# Patient Record
Sex: MALE
[De-identification: names, ages, dates, MRNs, and addresses within clinical notes are randomized per-mention and may not be internally consistent; named-entity substitution may affect disease eponyms.]

---

## 2018-11-15 ENCOUNTER — APPOINTMENT (OUTPATIENT)
Dept: CARDIOLOGY | Facility: CLINIC | Age: 58
End: 2018-11-15

## 2022-11-16 ENCOUNTER — APPOINTMENT (OUTPATIENT)
Dept: CARDIOLOGY | Facility: CLINIC | Age: 62
End: 2022-11-16

## 2022-11-16 ENCOUNTER — NON-APPOINTMENT (OUTPATIENT)
Age: 62
End: 2022-11-16

## 2022-11-16 VITALS — SYSTOLIC BLOOD PRESSURE: 140 MMHG | DIASTOLIC BLOOD PRESSURE: 98 MMHG

## 2022-11-16 VITALS
SYSTOLIC BLOOD PRESSURE: 146 MMHG | WEIGHT: 209 LBS | RESPIRATION RATE: 17 BRPM | HEART RATE: 77 BPM | OXYGEN SATURATION: 99 % | HEIGHT: 69.5 IN | DIASTOLIC BLOOD PRESSURE: 100 MMHG | BODY MASS INDEX: 30.26 KG/M2

## 2022-11-16 DIAGNOSIS — R94.31 ABNORMAL ELECTROCARDIOGRAM [ECG] [EKG]: ICD-10-CM

## 2022-11-16 DIAGNOSIS — Z78.9 OTHER SPECIFIED HEALTH STATUS: ICD-10-CM

## 2022-11-16 PROCEDURE — 99204 OFFICE O/P NEW MOD 45 MIN: CPT | Mod: 25

## 2022-11-16 PROCEDURE — 93000 ELECTROCARDIOGRAM COMPLETE: CPT

## 2022-11-16 NOTE — HISTORY OF PRESENT ILLNESS
[FreeTextEntry1] : He sought cardiovascular evaluation at his girlfriend's request.\par He is a 62-year-old with longstanding hypertension who reports feeling okay overall.  He does not perform any routine aerobic activity but denies any exertional chest pains or shortness of breath.\par He has no history of coronary artery disease, diabetes mellitus, smoking, hypercholesterolemia or family history of premature coronary artery disease.\par A prior knee arthroscopy.\par He reports not taking his medication this morning and just took it 45 minutes ago.

## 2022-12-06 ENCOUNTER — APPOINTMENT (OUTPATIENT)
Dept: UROLOGY | Facility: CLINIC | Age: 62
End: 2022-12-06

## 2022-12-06 DIAGNOSIS — N50.0 ATROPHY OF TESTIS: ICD-10-CM

## 2022-12-06 DIAGNOSIS — N52.9 MALE ERECTILE DYSFUNCTION, UNSPECIFIED: ICD-10-CM

## 2022-12-06 PROCEDURE — 99204 OFFICE O/P NEW MOD 45 MIN: CPT

## 2022-12-06 RX ORDER — TAMSULOSIN HYDROCHLORIDE 0.4 MG/1
0.4 CAPSULE ORAL
Qty: 30 | Refills: 8 | Status: ACTIVE | COMMUNITY
Start: 2022-12-06 | End: 1900-01-01

## 2022-12-06 NOTE — PHYSICAL EXAM
[General Appearance - Well Developed] : well developed [General Appearance - Well Nourished] : well nourished [Normal Appearance] : normal appearance [Well Groomed] : well groomed [General Appearance - In No Acute Distress] : no acute distress [Edema] : no peripheral edema [Respiration, Rhythm And Depth] : normal respiratory rhythm and effort [Exaggerated Use Of Accessory Muscles For Inspiration] : no accessory muscle use [Abdomen Soft] : soft [Abdomen Tenderness] : non-tender [Costovertebral Angle Tenderness] : no ~M costovertebral angle tenderness [Urethral Meatus] : meatus normal [Urinary Bladder Findings] : the bladder was normal on palpation [Testes Mass (___cm)] : there were no testicular masses [No Prostate Nodules] : no prostate nodules [FreeTextEntry1] : left testis uneven with harder small 2 mm nodule, right severely atrophic  [Normal Station and Gait] : the gait and station were normal for the patient's age [] : no rash [No Focal Deficits] : no focal deficits [Oriented To Time, Place, And Person] : oriented to person, place, and time [Affect] : the affect was normal [Mood] : the mood was normal [Not Anxious] : not anxious [No Palpable Adenopathy] : no palpable adenopathy

## 2022-12-06 NOTE — ASSESSMENT
[FreeTextEntry1] : ED - start cialis 5 mg daily \par may also help with BPH\par \par - labs \par \par Prostate at least 80 gm smooth \par \par LUTS \par \par strart flomax at night \par Cialis in AM \par \par may need proscar\par \par bring back for cysto \par \par abnormal scrotal exam - bring back for scrotal US \par \par Erectile dysfunction.\par \par Following was discussed with the patient.  Erectile dysfunction can affect him in the all ages.  Normal quality of erections depends on normal in flow of blood through the cavernosal arteries, distention of the corpora cavernosa and closure of the veins draining the penis.  I compared getting erectile dysfunction to feeling of the sink with force of being in the artery and drain being a vein.  This help patient understand physiology of erections.  Normal erection is regulated by sensory input from the penis as well as normal arousal and processing of the sexual cues.  Complex interplay between the neuroendocrine system and autonomic nervous system as well as sensory input was discussed.\par \par Most common reasons for erectile dysfunction in younger men are performance anxiety, sexual experience, lack of point of reference, and realistic expectations.  Congenital abnormalities of vessels can also be identified.  They typically present with inability to sustain erection from late teenager to early 20s.  Will call with venous leak.\par \par In older men erectile dysfunction can be due to atherosclerosis, elevated cholesterol, low testosterone, diabetes, injury to autonomic nervous system, loss of sensation, abnormal processing of sexual cues and disorder of arousal.\par \par Obesity, smoking, use of statin medication can negatively affect erectile function.\par \par Increasing exercise, healthy eating habits, getting adequate amount of sleep, all of those can be helpful in attaining normal erections.\par \par \par Evaluation of erectile dysfunction is here to work patient's needs and goals.  Typically will start with just oral medication with medication like Cialis or Viagra.  Use of those medications was explained to the patient.  Difference between on demand and daily use was discussed.  Viagra needs to be taken on empty stomach.\par \par Mechanism of action of erectile dysfunction medication was also discussed.\par \par In men who have failed to respond to oral therapy or who have inadequate response level we then moved to penile Doppler ultrasound after penile injection.\par \par Injection of vasculogenic medication, specifically combination of papaverine, phentolamine and prostaglandin E1 is necessary to achieve adequate erection during penile Doppler ultrasound.  If penile Doppler ultrasound is scheduled then Trimix prescription is sent to the pharmacy and patient is made aware that he needs to bring the medication on the day of procedure as we cannot start the medication.\par \par Risks of Trimix injection as well as penile Doppler ultrasound like prolonged erection from the cavernosal injection of Trimix or embarrassment during the penile Doppler ultrasound were discussed with the patient.\par \par Depending on the results of penile Doppler ultrasound we may recommend that the patient see his cardiologist to have cardiac evaluation especially if blood flow through the cavernosal arteries is abnormal.\par \par If venous leak was found or arteriovenous shunting was identified in we discussed with patient management of these conditions.  Specifically venous leak can be sometimes corrected by modified venous stripping using Garcia technique.  In everything fails we can place penile prosthesis however this is the last choice and last step in treating of erectile dysfunction.\par \par Use of self injection is often recommended after patient achieve adequate erection during the penile Doppler ultrasound.\par \par Correction of high hemoglobin A1c, high lipids, low testosterone and cessation of smoking are always recommended.\par \par Prescriptions were given.  Side effects were explained.  Patient will follow-up with me as scheduled.\par \par Risk of priapism was explained.\par \par I have spent total time of 41 min excluding separately reported  time spend performing in the office procedures if such done today. \par \par This includes time spent face to face discussing evaluation, treatment options,  risk, complications, and benefits of each of the therapies and triggers to return to office.\par Social determinants of disease were assessed and necessary measures implemented.\par \par Discussed behavioral modification.\par \par Preventive health measures: effect of BMI, age, high cholesterol, hypertension and DM on sexual and reproductive function in men discussed. \par \par Discussed goals of therapy, realistic expectations. \par \par Pharmacological,holistic, and procedural interventions discussed as appropriate. \par \par Reviewing pertinent notes from other physicians, past lab results, imaging studies, coordinating care, entering orders and preparing note.\par Included time spend on refills of medications and new scripts if needed. \par \par Testosterone is a control substance requiring additional time for refills and prescriptions with dual identify verification. \par If prescribed the time spent on prescribing controlled substances was included in total time. \par \par Use of follow-my health discussed and encouraged.\par

## 2022-12-06 NOTE — HISTORY OF PRESENT ILLNESS
[FreeTextEntry1] : BPH \par LUTS\par \par had issues 10 years ago had TUNA \par - nocturia 3 to 4 times a night \par - no blood in the urine\par - no incontinence \par - daily frequency \par no smoking\par \par ED - not as strong as in the past \par \par

## 2022-12-07 ENCOUNTER — NON-APPOINTMENT (OUTPATIENT)
Age: 62
End: 2022-12-07

## 2022-12-07 LAB
ALBUMIN SERPL ELPH-MCNC: 4.8 G/DL
ALP BLD-CCNC: 135 U/L
ALT SERPL-CCNC: 27 U/L
ANION GAP SERPL CALC-SCNC: 14 MMOL/L
AST SERPL-CCNC: 17 U/L
BASOPHILS # BLD AUTO: 0.02 K/UL
BASOPHILS NFR BLD AUTO: 0.5 %
BILIRUB SERPL-MCNC: 0.4 MG/DL
BUN SERPL-MCNC: 13 MG/DL
CALCIUM SERPL-MCNC: 10.4 MG/DL
CHLORIDE SERPL-SCNC: 98 MMOL/L
CHOLEST SERPL-MCNC: 260 MG/DL
CO2 SERPL-SCNC: 27 MMOL/L
CREAT SERPL-MCNC: 0.63 MG/DL
CRP SERPL HS-MCNC: 5.17 MG/L
EGFR: 108 ML/MIN/1.73M2
EOSINOPHIL # BLD AUTO: 0.08 K/UL
EOSINOPHIL NFR BLD AUTO: 1.9 %
ESTIMATED AVERAGE GLUCOSE: 321 MG/DL
GLUCOSE SERPL-MCNC: 289 MG/DL
HBA1C MFR BLD HPLC: 12.8 %
HCT VFR BLD CALC: 45.9 %
HDLC SERPL-MCNC: 61 MG/DL
HGB BLD-MCNC: 14.5 G/DL
IMM GRANULOCYTES NFR BLD AUTO: 0.5 %
LDLC SERPL CALC-MCNC: 160 MG/DL
LYMPHOCYTES # BLD AUTO: 1.26 K/UL
LYMPHOCYTES NFR BLD AUTO: 29.2 %
MAN DIFF?: NORMAL
MCHC RBC-ENTMCNC: 27.7 PG
MCHC RBC-ENTMCNC: 31.6 GM/DL
MCV RBC AUTO: 87.8 FL
MONOCYTES # BLD AUTO: 0.37 K/UL
MONOCYTES NFR BLD AUTO: 8.6 %
NEUTROPHILS # BLD AUTO: 2.56 K/UL
NEUTROPHILS NFR BLD AUTO: 59.3 %
NONHDLC SERPL-MCNC: 199 MG/DL
PLATELET # BLD AUTO: 170 K/UL
POTASSIUM SERPL-SCNC: 4.2 MMOL/L
PROT SERPL-MCNC: 7.4 G/DL
PSA SERPL-MCNC: 2.05 NG/ML
RBC # BLD: 5.23 M/UL
RBC # FLD: 13.4 %
SODIUM SERPL-SCNC: 138 MMOL/L
TRIGL SERPL-MCNC: 194 MG/DL
TSH SERPL-ACNC: 1.09 UIU/ML
WBC # FLD AUTO: 4.31 K/UL

## 2022-12-09 LAB
APO LP(A) SERPL-MCNC: 35.8 NMOL/L
TESTOST FREE SERPL-MCNC: 5.1 PG/ML
TESTOST SERPL-MCNC: 172 NG/DL

## 2022-12-14 ENCOUNTER — APPOINTMENT (OUTPATIENT)
Dept: CARDIOLOGY | Facility: CLINIC | Age: 62
End: 2022-12-14
Payer: COMMERCIAL

## 2022-12-14 ENCOUNTER — NON-APPOINTMENT (OUTPATIENT)
Age: 62
End: 2022-12-14

## 2022-12-14 VITALS
DIASTOLIC BLOOD PRESSURE: 82 MMHG | WEIGHT: 208 LBS | OXYGEN SATURATION: 99 % | SYSTOLIC BLOOD PRESSURE: 158 MMHG | BODY MASS INDEX: 30.28 KG/M2 | HEART RATE: 96 BPM

## 2022-12-14 PROCEDURE — 99214 OFFICE O/P EST MOD 30 MIN: CPT | Mod: 25

## 2022-12-14 PROCEDURE — 93306 TTE W/DOPPLER COMPLETE: CPT

## 2022-12-14 PROCEDURE — 93000 ELECTROCARDIOGRAM COMPLETE: CPT

## 2022-12-14 NOTE — DISCUSSION/SUMMARY
[FreeTextEntry1] : He is a 62-year-old with longstanding hypertension and newly diagnosed uncontrolled diabetes mellitus with hyperlipidemia.  ECG shows sinus rhythm and is otherwise unchanged.\par Hypertension: Still suboptimal despite metoprolol and amlodipine.  Will add valsartan hand for its renal protective effects given his new diabetes mellitus.Echo in the future to monitor LVH.\par Hyperlipidemia: We will begin Lipitor 20 mg once nightly.  Repeat labs in 6 to 8 weeks.\par New onset diabetes mellitus: I have referred him to internal medicine and started him on metformin in the interim.  This was reviewed with him and his girlfriend.  His girlfriend has some reservations about using metformin because of its side effect profile.  I reassured her that we will monitor him carefully and that other medication that other medications may become more appropriate in the future.\par  [EKG obtained to assist in diagnosis and management of assessed problem(s)] : EKG obtained to assist in diagnosis and management of assessed problem(s)

## 2022-12-14 NOTE — HISTORY OF PRESENT ILLNESS
[FreeTextEntry1] : Blood work done by his urologist showed elevated hemoglobin A1c of over 12.  His LDL was over 160.\par I reviewed these findings with him and his girlfriend.\par He will require more aggressive medical therapy and he asked me for the name of an internist.\par \par Prior:\par He sought cardiovascular evaluation at his girlfriend's request.\par He is a 62-year-old with longstanding hypertension who reports feeling okay overall.  He does not perform any routine aerobic activity but denies any exertional chest pains or shortness of breath.\par He has no history of coronary artery disease, diabetes mellitus, smoking, hypercholesterolemia or family history of premature coronary artery disease.\par A prior knee arthroscopy.\par He reports not taking his medication this morning and just took it 45 minutes ago.

## 2022-12-20 ENCOUNTER — APPOINTMENT (OUTPATIENT)
Dept: INTERNAL MEDICINE | Facility: CLINIC | Age: 62
End: 2022-12-20

## 2022-12-20 ENCOUNTER — NON-APPOINTMENT (OUTPATIENT)
Age: 62
End: 2022-12-20

## 2022-12-20 VITALS — DIASTOLIC BLOOD PRESSURE: 90 MMHG | SYSTOLIC BLOOD PRESSURE: 160 MMHG

## 2022-12-20 VITALS
HEART RATE: 83 BPM | HEIGHT: 69.5 IN | DIASTOLIC BLOOD PRESSURE: 93 MMHG | BODY MASS INDEX: 29.83 KG/M2 | SYSTOLIC BLOOD PRESSURE: 165 MMHG | TEMPERATURE: 97.8 F | WEIGHT: 206 LBS | OXYGEN SATURATION: 97 %

## 2022-12-20 DIAGNOSIS — Z12.11 ENCOUNTER FOR SCREENING FOR MALIGNANT NEOPLASM OF COLON: ICD-10-CM

## 2022-12-20 DIAGNOSIS — Z80.42 FAMILY HISTORY OF MALIGNANT NEOPLASM OF PROSTATE: ICD-10-CM

## 2022-12-20 DIAGNOSIS — Z00.00 ENCOUNTER FOR GENERAL ADULT MEDICAL EXAMINATION W/OUT ABNORMAL FINDINGS: ICD-10-CM

## 2022-12-20 DIAGNOSIS — Z83.3 FAMILY HISTORY OF DIABETES MELLITUS: ICD-10-CM

## 2022-12-20 PROCEDURE — G0444 DEPRESSION SCREEN ANNUAL: CPT | Mod: 59

## 2022-12-20 PROCEDURE — 99386 PREV VISIT NEW AGE 40-64: CPT | Mod: 25

## 2023-01-09 ENCOUNTER — RX RENEWAL (OUTPATIENT)
Age: 63
End: 2023-01-09

## 2023-01-23 ENCOUNTER — APPOINTMENT (OUTPATIENT)
Dept: UROLOGY | Facility: CLINIC | Age: 63
End: 2023-01-23

## 2023-02-07 ENCOUNTER — RX RENEWAL (OUTPATIENT)
Age: 63
End: 2023-02-07

## 2023-03-02 ENCOUNTER — APPOINTMENT (OUTPATIENT)
Dept: OPHTHALMOLOGY | Facility: CLINIC | Age: 63
End: 2023-03-02

## 2023-03-08 ENCOUNTER — RX RENEWAL (OUTPATIENT)
Age: 63
End: 2023-03-08

## 2023-03-22 ENCOUNTER — APPOINTMENT (OUTPATIENT)
Dept: ENDOCRINOLOGY | Facility: CLINIC | Age: 63
End: 2023-03-22
Payer: COMMERCIAL

## 2023-03-22 VITALS — BODY MASS INDEX: 31.85 KG/M2 | WEIGHT: 220 LBS | HEIGHT: 69.5 IN

## 2023-03-22 PROCEDURE — G0108 DIAB MANAGE TRN  PER INDIV: CPT

## 2023-03-24 ENCOUNTER — APPOINTMENT (OUTPATIENT)
Dept: INTERNAL MEDICINE | Facility: CLINIC | Age: 63
End: 2023-03-24
Payer: COMMERCIAL

## 2023-03-24 VITALS — SYSTOLIC BLOOD PRESSURE: 148 MMHG | DIASTOLIC BLOOD PRESSURE: 86 MMHG

## 2023-03-24 VITALS
HEART RATE: 78 BPM | OXYGEN SATURATION: 98 % | BODY MASS INDEX: 31.27 KG/M2 | HEIGHT: 69.5 IN | TEMPERATURE: 98.7 F | SYSTOLIC BLOOD PRESSURE: 158 MMHG | DIASTOLIC BLOOD PRESSURE: 90 MMHG | WEIGHT: 216 LBS

## 2023-03-24 PROCEDURE — 99213 OFFICE O/P EST LOW 20 MIN: CPT | Mod: 25

## 2023-03-27 LAB
ALBUMIN SERPL ELPH-MCNC: 4.6 G/DL
ALP BLD-CCNC: 88 U/L
ALT SERPL-CCNC: 25 U/L
ANION GAP SERPL CALC-SCNC: 10 MMOL/L
AST SERPL-CCNC: 17 U/L
BILIRUB SERPL-MCNC: 0.7 MG/DL
BUN SERPL-MCNC: 14 MG/DL
CALCIUM SERPL-MCNC: 10.2 MG/DL
CHLORIDE SERPL-SCNC: 104 MMOL/L
CHOLEST SERPL-MCNC: 161 MG/DL
CO2 SERPL-SCNC: 27 MMOL/L
CREAT SERPL-MCNC: 0.7 MG/DL
CREAT SPEC-SCNC: 63 MG/DL
EGFR: 105 ML/MIN/1.73M2
ESTIMATED AVERAGE GLUCOSE: 166 MG/DL
GLUCOSE SERPL-MCNC: 144 MG/DL
HBA1C MFR BLD HPLC: 7.4 %
HDLC SERPL-MCNC: 69 MG/DL
LDLC SERPL CALC-MCNC: 80 MG/DL
MICROALBUMIN 24H UR DL<=1MG/L-MCNC: <1.2 MG/DL
MICROALBUMIN/CREAT 24H UR-RTO: NORMAL MG/G
NONHDLC SERPL-MCNC: 91 MG/DL
POTASSIUM SERPL-SCNC: 4.2 MMOL/L
PROT SERPL-MCNC: 6.9 G/DL
SODIUM SERPL-SCNC: 141 MMOL/L
TRIGL SERPL-MCNC: 56 MG/DL
VIT B12 SERPL-MCNC: 896 PG/ML

## 2023-04-10 ENCOUNTER — RX RENEWAL (OUTPATIENT)
Age: 63
End: 2023-04-10

## 2023-05-11 ENCOUNTER — RX RENEWAL (OUTPATIENT)
Age: 63
End: 2023-05-11

## 2023-06-12 ENCOUNTER — RX RENEWAL (OUTPATIENT)
Age: 63
End: 2023-06-12

## 2023-06-21 ENCOUNTER — RX RENEWAL (OUTPATIENT)
Age: 63
End: 2023-06-21

## 2023-06-22 RX ORDER — VALSARTAN 160 MG/1
160 TABLET, COATED ORAL DAILY
Qty: 90 | Refills: 2 | Status: ACTIVE | COMMUNITY
Start: 2022-12-14 | End: 1900-01-01

## 2023-06-28 ENCOUNTER — APPOINTMENT (OUTPATIENT)
Dept: ENDOCRINOLOGY | Facility: CLINIC | Age: 63
End: 2023-06-28

## 2023-07-10 ENCOUNTER — RX RENEWAL (OUTPATIENT)
Age: 63
End: 2023-07-10

## 2023-07-14 ENCOUNTER — APPOINTMENT (OUTPATIENT)
Dept: INTERNAL MEDICINE | Facility: CLINIC | Age: 63
End: 2023-07-14
Payer: COMMERCIAL

## 2023-07-14 VITALS
BODY MASS INDEX: 31.85 KG/M2 | TEMPERATURE: 97.9 F | HEART RATE: 80 BPM | HEIGHT: 69.5 IN | WEIGHT: 220 LBS | DIASTOLIC BLOOD PRESSURE: 96 MMHG | SYSTOLIC BLOOD PRESSURE: 151 MMHG | OXYGEN SATURATION: 97 %

## 2023-07-14 VITALS — DIASTOLIC BLOOD PRESSURE: 86 MMHG | SYSTOLIC BLOOD PRESSURE: 136 MMHG

## 2023-07-14 DIAGNOSIS — E78.2 MIXED HYPERLIPIDEMIA: ICD-10-CM

## 2023-07-14 DIAGNOSIS — E11.9 TYPE 2 DIABETES MELLITUS W/OUT COMPLICATIONS: ICD-10-CM

## 2023-07-14 DIAGNOSIS — I10 ESSENTIAL (PRIMARY) HYPERTENSION: ICD-10-CM

## 2023-07-14 PROCEDURE — 99214 OFFICE O/P EST MOD 30 MIN: CPT | Mod: 25

## 2023-07-16 LAB
ALBUMIN SERPL ELPH-MCNC: 4.9 G/DL
ALP BLD-CCNC: 99 U/L
ALT SERPL-CCNC: 35 U/L
ANION GAP SERPL CALC-SCNC: 9 MMOL/L
AST SERPL-CCNC: 21 U/L
BILIRUB SERPL-MCNC: 0.5 MG/DL
BUN SERPL-MCNC: 12 MG/DL
CALCIUM SERPL-MCNC: 10.3 MG/DL
CHLORIDE SERPL-SCNC: 102 MMOL/L
CHOLEST SERPL-MCNC: 144 MG/DL
CO2 SERPL-SCNC: 28 MMOL/L
CREAT SERPL-MCNC: 0.64 MG/DL
EGFR: 107 ML/MIN/1.73M2
ESTIMATED AVERAGE GLUCOSE: 174 MG/DL
GLUCOSE SERPL-MCNC: 124 MG/DL
HBA1C MFR BLD HPLC: 7.7 %
HDLC SERPL-MCNC: 65 MG/DL
LDLC SERPL CALC-MCNC: 56 MG/DL
NONHDLC SERPL-MCNC: 78 MG/DL
POTASSIUM SERPL-SCNC: 4.5 MMOL/L
PROT SERPL-MCNC: 7.2 G/DL
SODIUM SERPL-SCNC: 139 MMOL/L
TRIGL SERPL-MCNC: 127 MG/DL
VIT B12 SERPL-MCNC: 1138 PG/ML

## 2023-07-31 ENCOUNTER — RX RENEWAL (OUTPATIENT)
Age: 63
End: 2023-07-31

## 2023-08-02 ENCOUNTER — RX RENEWAL (OUTPATIENT)
Age: 63
End: 2023-08-02

## 2023-09-01 ENCOUNTER — RX RENEWAL (OUTPATIENT)
Age: 63
End: 2023-09-01

## 2023-09-04 ENCOUNTER — RX RENEWAL (OUTPATIENT)
Age: 63
End: 2023-09-04

## 2023-09-13 ENCOUNTER — APPOINTMENT (OUTPATIENT)
Dept: UROLOGY | Facility: CLINIC | Age: 63
End: 2023-09-13
Payer: COMMERCIAL

## 2023-09-13 DIAGNOSIS — N40.1 BENIGN PROSTATIC HYPERPLASIA WITH LOWER URINARY TRACT SYMPMS: ICD-10-CM

## 2023-09-13 DIAGNOSIS — R35.1 BENIGN PROSTATIC HYPERPLASIA WITH LOWER URINARY TRACT SYMPMS: ICD-10-CM

## 2023-09-13 DIAGNOSIS — Z12.5 ENCOUNTER FOR SCREENING FOR MALIGNANT NEOPLASM OF PROSTATE: ICD-10-CM

## 2023-09-13 PROCEDURE — 99214 OFFICE O/P EST MOD 30 MIN: CPT | Mod: 25

## 2023-09-13 PROCEDURE — 51798 US URINE CAPACITY MEASURE: CPT

## 2023-09-13 RX ORDER — TAMSULOSIN HYDROCHLORIDE 0.4 MG/1
0.4 CAPSULE ORAL DAILY
Qty: 30 | Refills: 11 | Status: ACTIVE | COMMUNITY
Start: 2023-09-13 | End: 1900-01-01

## 2023-09-14 LAB — PSA SERPL-MCNC: 2.24 NG/ML

## 2023-09-19 ENCOUNTER — RX RENEWAL (OUTPATIENT)
Age: 63
End: 2023-09-19

## 2023-09-19 RX ORDER — VALSARTAN 80 MG/1
80 TABLET, COATED ORAL
Qty: 90 | Refills: 3 | Status: ACTIVE | COMMUNITY
Start: 2023-09-19 | End: 1900-01-01

## 2023-09-30 ENCOUNTER — RX RENEWAL (OUTPATIENT)
Age: 63
End: 2023-09-30

## 2023-11-01 ENCOUNTER — APPOINTMENT (OUTPATIENT)
Dept: CARDIOLOGY | Facility: CLINIC | Age: 63
End: 2023-11-01

## 2024-01-29 ENCOUNTER — RX RENEWAL (OUTPATIENT)
Age: 64
End: 2024-01-29

## 2024-01-29 RX ORDER — AMLODIPINE BESYLATE 10 MG/1
10 TABLET ORAL DAILY
Qty: 90 | Refills: 1 | Status: ACTIVE | COMMUNITY
Start: 2023-07-31 | End: 1900-01-01

## 2024-02-14 ENCOUNTER — NON-APPOINTMENT (OUTPATIENT)
Age: 64
End: 2024-02-14

## 2024-02-14 RX ORDER — TADALAFIL 5 MG/1
5 TABLET ORAL
Qty: 90 | Refills: 3 | Status: ACTIVE | COMMUNITY
Start: 2022-12-06 | End: 1900-01-01

## 2024-05-29 ENCOUNTER — RX RENEWAL (OUTPATIENT)
Age: 64
End: 2024-05-29

## 2024-05-29 RX ORDER — METOPROLOL SUCCINATE 50 MG/1
50 TABLET, EXTENDED RELEASE ORAL
Qty: 90 | Refills: 0 | Status: ACTIVE | COMMUNITY
Start: 2022-11-16 | End: 1900-01-01

## 2024-07-02 ENCOUNTER — RX RENEWAL (OUTPATIENT)
Age: 64
End: 2024-07-02

## 2024-07-05 ENCOUNTER — RX RENEWAL (OUTPATIENT)
Age: 64
End: 2024-07-05

## 2024-08-09 ENCOUNTER — RX RENEWAL (OUTPATIENT)
Age: 64
End: 2024-08-09

## 2024-09-11 ENCOUNTER — RX RENEWAL (OUTPATIENT)
Age: 64
End: 2024-09-11

## 2024-09-12 ENCOUNTER — NON-APPOINTMENT (OUTPATIENT)
Age: 64
End: 2024-09-12

## 2024-09-20 ENCOUNTER — APPOINTMENT (OUTPATIENT)
Dept: UROLOGY | Facility: CLINIC | Age: 64
End: 2024-09-20
Payer: COMMERCIAL

## 2024-09-20 VITALS
SYSTOLIC BLOOD PRESSURE: 161 MMHG | WEIGHT: 220 LBS | BODY MASS INDEX: 31.85 KG/M2 | RESPIRATION RATE: 16 BRPM | HEIGHT: 69.5 IN | DIASTOLIC BLOOD PRESSURE: 85 MMHG | HEART RATE: 80 BPM | OXYGEN SATURATION: 99 %

## 2024-09-20 DIAGNOSIS — Z12.5 ENCOUNTER FOR SCREENING FOR MALIGNANT NEOPLASM OF PROSTATE: ICD-10-CM

## 2024-09-20 DIAGNOSIS — N40.1 BENIGN PROSTATIC HYPERPLASIA WITH LOWER URINARY TRACT SYMPMS: ICD-10-CM

## 2024-09-20 DIAGNOSIS — R35.1 BENIGN PROSTATIC HYPERPLASIA WITH LOWER URINARY TRACT SYMPMS: ICD-10-CM

## 2024-09-20 DIAGNOSIS — R35.1 NOCTURIA: ICD-10-CM

## 2024-09-20 DIAGNOSIS — N52.9 MALE ERECTILE DYSFUNCTION, UNSPECIFIED: ICD-10-CM

## 2024-09-20 PROCEDURE — 51798 US URINE CAPACITY MEASURE: CPT

## 2024-09-20 PROCEDURE — G2211 COMPLEX E/M VISIT ADD ON: CPT | Mod: NC

## 2024-09-20 PROCEDURE — 99204 OFFICE O/P NEW MOD 45 MIN: CPT | Mod: 25

## 2024-09-20 RX ORDER — TROSPIUM CHLORIDE 20 MG/1
20 TABLET, FILM COATED ORAL
Qty: 30 | Refills: 3 | Status: ACTIVE | COMMUNITY
Start: 2024-09-20 | End: 1900-01-01

## 2024-09-20 NOTE — REASON FOR VISIT
[TextEntry] : 62 yo male with hx of BPH/LUTS, ED, and family history of PCa  Pt previously followed by Dr. Rodriguez. Pt with hx of TUNA procedure 10 years ago; nocturia 3-4x, frequency. EUNICE revealed 80g prostate. When last seen in 2022, pt started on cialis AM and flomax PM. He was planning for cystoscopy. he was also found to have a scrotal nodule and was planned to undergo scrotal US.   pt now returns for follow up. He has been doing well since last seen; he ran out of flomax and returns today for refill. he denies daytime symptoms but has nocturia 4-5x. He denies UTI issues, hematuria, leakage. He drinks mainly water throughout the day. endorses snoring, but denies sleep apnea. would be interested in a sleep study.  still using cialis PRN, does not need refill at this time.  pt also with history of scrotal surgery for fertility many years ago with residual scar tissue.   Pt with fhx of prostate cancer, father  from PCa. Last EUNICE was with Dr. Rodriguez.  Genitourinary: meatus normal, normal uncircumcised penis, the scrotum was normal, there were no testicular masses, the prostate was not tender and no prostate nodules . digital rectal exam was normal.   PVR 178cc Refilled flomax, advised double void. For ED, continue cialis.  PSA 2.24 2023 PSA 2.05 2022  Today on  patient reports nocturia 3-4 times over the past month.  He denies changes in his medication.  He reports poor sleep hygiene but no major changes.  He does use NyQuil at times.  He denies significant snoring or concern for sleep apnea.  At night he feels like he strains to void but he has no complaints during the day.  He continues on Cialis and Flomax.  PVR 8

## 2024-09-20 NOTE — ASSESSMENT
[FreeTextEntry1] : 64-year-old male who presents for:  1.  Prostate cancer screening-will obtain PSA today.  2.  LUTS-continue Flomax and Cialis.  Will send his urine today for urinalysis to assess for glucosuria.  He feels his diet is poor in the setting of type 2 diabetes.  I am also sending trospium 20 mg for him to take at night.  We also discussed improved sleep hygiene and fluid restriction before bed.  Return to clinic in 6 weeks to reassess

## 2024-09-23 LAB
APPEARANCE: CLEAR
BACTERIA UR CULT: NORMAL
BACTERIA: NEGATIVE /HPF
BILIRUBIN URINE: NEGATIVE
BLOOD URINE: NEGATIVE
CAST: 0 /LPF
COLOR: YELLOW
EPITHELIAL CELLS: 0 /HPF
GLUCOSE QUALITATIVE U: NEGATIVE MG/DL
KETONES URINE: NEGATIVE MG/DL
LEUKOCYTE ESTERASE URINE: NEGATIVE
MICROSCOPIC-UA: NORMAL
NITRITE URINE: NEGATIVE
PH URINE: 6
PROTEIN URINE: NEGATIVE MG/DL
PSA SERPL-MCNC: 2.66 NG/ML
RED BLOOD CELLS URINE: 0 /HPF
SPECIFIC GRAVITY URINE: 1.02
UROBILINOGEN URINE: 0.2 MG/DL
WHITE BLOOD CELLS URINE: 0 /HPF

## 2024-12-18 ENCOUNTER — APPOINTMENT (OUTPATIENT)
Dept: CARDIOLOGY | Facility: CLINIC | Age: 64
End: 2024-12-18

## 2025-01-22 ENCOUNTER — APPOINTMENT (OUTPATIENT)
Dept: CARDIOLOGY | Facility: CLINIC | Age: 65
End: 2025-01-22

## 2025-02-03 ENCOUNTER — RX RENEWAL (OUTPATIENT)
Age: 65
End: 2025-02-03

## 2025-02-06 ENCOUNTER — APPOINTMENT (OUTPATIENT)
Dept: INTERNAL MEDICINE | Facility: CLINIC | Age: 65
End: 2025-02-06
Payer: COMMERCIAL

## 2025-02-06 VITALS — DIASTOLIC BLOOD PRESSURE: 84 MMHG | SYSTOLIC BLOOD PRESSURE: 136 MMHG

## 2025-02-06 VITALS
SYSTOLIC BLOOD PRESSURE: 143 MMHG | TEMPERATURE: 98.4 F | HEART RATE: 74 BPM | HEIGHT: 69 IN | BODY MASS INDEX: 31.84 KG/M2 | RESPIRATION RATE: 15 BRPM | WEIGHT: 215 LBS | OXYGEN SATURATION: 98 % | DIASTOLIC BLOOD PRESSURE: 86 MMHG

## 2025-02-06 DIAGNOSIS — I10 ESSENTIAL (PRIMARY) HYPERTENSION: ICD-10-CM

## 2025-02-06 DIAGNOSIS — E78.2 MIXED HYPERLIPIDEMIA: ICD-10-CM

## 2025-02-06 DIAGNOSIS — Z00.00 ENCOUNTER FOR GENERAL ADULT MEDICAL EXAMINATION W/OUT ABNORMAL FINDINGS: ICD-10-CM

## 2025-02-06 DIAGNOSIS — S00.511A ABRASION OF LIP, INITIAL ENCOUNTER: ICD-10-CM

## 2025-02-06 DIAGNOSIS — E11.9 TYPE 2 DIABETES MELLITUS W/OUT COMPLICATIONS: ICD-10-CM

## 2025-02-06 PROCEDURE — 99214 OFFICE O/P EST MOD 30 MIN: CPT | Mod: 25

## 2025-02-07 ENCOUNTER — NON-APPOINTMENT (OUTPATIENT)
Age: 65
End: 2025-02-07

## 2025-02-07 DIAGNOSIS — D64.9 ANEMIA, UNSPECIFIED: ICD-10-CM

## 2025-02-13 LAB
25(OH)D3 SERPL-MCNC: 21.5 NG/ML
ALBUMIN SERPL ELPH-MCNC: 4.1 G/DL
ALP BLD-CCNC: 103 U/L
ALT SERPL-CCNC: 11 U/L
ANION GAP SERPL CALC-SCNC: 9 MMOL/L
AST SERPL-CCNC: 20 U/L
BILIRUB SERPL-MCNC: 0.4 MG/DL
BUN SERPL-MCNC: 18 MG/DL
CALCIUM SERPL-MCNC: 10 MG/DL
CHLORIDE SERPL-SCNC: 105 MMOL/L
CHOLEST SERPL-MCNC: 136 MG/DL
CO2 SERPL-SCNC: 27 MMOL/L
CREAT SERPL-MCNC: 0.73 MG/DL
CREAT SPEC-SCNC: 119 MG/DL
EGFR: 102 ML/MIN/1.73M2
ESTIMATED AVERAGE GLUCOSE: 160 MG/DL
FERRITIN SERPL-MCNC: 72 NG/ML
FOLATE SERPL-MCNC: 11.3 NG/ML
GLUCOSE SERPL-MCNC: 129 MG/DL
HBA1C MFR BLD HPLC: 7.2 %
HCT VFR BLD CALC: 37.8 %
HDLC SERPL-MCNC: 55 MG/DL
HGB BLD-MCNC: 12.1 G/DL
IRON SATN MFR SERPL: 23 %
IRON SERPL-MCNC: 71 UG/DL
LDLC SERPL CALC-MCNC: 62 MG/DL
MCHC RBC-ENTMCNC: 28.6 PG
MCHC RBC-ENTMCNC: 32 G/DL
MCV RBC AUTO: 89.4 FL
MICROALBUMIN 24H UR DL<=1MG/L-MCNC: <1.2 MG/DL
MICROALBUMIN/CREAT 24H UR-RTO: NORMAL MG/G
NONHDLC SERPL-MCNC: 81 MG/DL
PLATELET # BLD AUTO: 206 K/UL
POTASSIUM SERPL-SCNC: 4.9 MMOL/L
PROT SERPL-MCNC: 6.3 G/DL
RBC # BLD: 4.23 M/UL
RBC # FLD: 14.4 %
SODIUM SERPL-SCNC: 142 MMOL/L
TIBC SERPL-MCNC: 310 UG/DL
TRIGL SERPL-MCNC: 106 MG/DL
TSH SERPL-ACNC: 0.93 UIU/ML
UIBC SERPL-MCNC: 238 UG/DL
VIT B12 SERPL-MCNC: 548 PG/ML
VIT B12 SERPL-MCNC: 562 PG/ML
WBC # FLD AUTO: 3.96 K/UL

## 2025-02-14 ENCOUNTER — RX CHANGE (OUTPATIENT)
Age: 65
End: 2025-02-14

## 2025-02-14 RX ORDER — METFORMIN HYDROCHLORIDE 1000 MG/1
1000 TABLET, COATED ORAL
Qty: 180 | Refills: 1 | Status: ACTIVE | COMMUNITY
Start: 1900-01-01 | End: 1900-01-01

## 2025-03-07 ENCOUNTER — APPOINTMENT (OUTPATIENT)
Dept: DERMATOLOGY | Facility: CLINIC | Age: 65
End: 2025-03-07

## 2025-03-14 ENCOUNTER — RX RENEWAL (OUTPATIENT)
Age: 65
End: 2025-03-14

## 2025-03-14 ENCOUNTER — RX CHANGE (OUTPATIENT)
Age: 65
End: 2025-03-14

## 2025-03-14 RX ORDER — METFORMIN HYDROCHLORIDE 1000 MG/1
1000 TABLET, COATED ORAL
Qty: 180 | Refills: 1 | Status: ACTIVE | COMMUNITY
Start: 1900-01-01 | End: 1900-01-01

## 2025-05-16 ENCOUNTER — APPOINTMENT (OUTPATIENT)
Dept: INTERNAL MEDICINE | Facility: CLINIC | Age: 65
End: 2025-05-16

## 2025-05-21 ENCOUNTER — RX RENEWAL (OUTPATIENT)
Age: 65
End: 2025-05-21

## 2025-07-08 ENCOUNTER — RX RENEWAL (OUTPATIENT)
Age: 65
End: 2025-07-08

## 2025-08-04 ENCOUNTER — NON-APPOINTMENT (OUTPATIENT)
Age: 65
End: 2025-08-04

## 2025-09-04 ENCOUNTER — RX RENEWAL (OUTPATIENT)
Age: 65
End: 2025-09-04